# Patient Record
(demographics unavailable — no encounter records)

---

## 2024-11-26 NOTE — HISTORY OF PRESENT ILLNESS
[FreeTextEntry1] : Date of Injury: July 20, 2024 (4 months, 6 days ago)  The patient returns for follow up in regard to her right long and right ring finger distal phalanx tuft fractures sustained 4 months and 6 days ago. The patient reports ___.